# Patient Record
Sex: FEMALE | Race: OTHER | HISPANIC OR LATINO
[De-identification: names, ages, dates, MRNs, and addresses within clinical notes are randomized per-mention and may not be internally consistent; named-entity substitution may affect disease eponyms.]

---

## 2018-03-13 ENCOUNTER — APPOINTMENT (OUTPATIENT)
Dept: ANTEPARTUM | Facility: CLINIC | Age: 43
End: 2018-03-13

## 2018-03-13 PROBLEM — Z00.00 ENCOUNTER FOR PREVENTIVE HEALTH EXAMINATION: Status: ACTIVE | Noted: 2018-03-13

## 2018-03-27 ENCOUNTER — ASOB RESULT (OUTPATIENT)
Age: 43
End: 2018-03-27

## 2018-03-27 ENCOUNTER — APPOINTMENT (OUTPATIENT)
Dept: ANTEPARTUM | Facility: CLINIC | Age: 43
End: 2018-03-27
Payer: MEDICARE

## 2018-03-27 PROCEDURE — 76857 US EXAM PELVIC LIMITED: CPT

## 2018-03-27 PROCEDURE — 76830 TRANSVAGINAL US NON-OB: CPT

## 2019-02-02 ENCOUNTER — EMERGENCY (EMERGENCY)
Facility: HOSPITAL | Age: 44
LOS: 1 days | Discharge: HOME | End: 2019-02-02
Attending: EMERGENCY MEDICINE
Payer: COMMERCIAL

## 2019-02-02 VITALS — WEIGHT: 154.98 LBS | HEIGHT: 65 IN

## 2019-02-02 VITALS
OXYGEN SATURATION: 100 % | SYSTOLIC BLOOD PRESSURE: 136 MMHG | TEMPERATURE: 98 F | RESPIRATION RATE: 18 BRPM | DIASTOLIC BLOOD PRESSURE: 87 MMHG | HEART RATE: 68 BPM

## 2019-02-02 PROCEDURE — 99283 EMERGENCY DEPT VISIT LOW MDM: CPT

## 2019-02-02 RX ORDER — DIAZEPAM 5 MG
1 TABLET ORAL
Qty: 6 | Refills: 0
Start: 2019-02-02 | End: 2019-02-04

## 2019-02-02 RX ORDER — DIAZEPAM 5 MG
5 TABLET ORAL ONCE
Qty: 0 | Refills: 0 | Status: DISCONTINUED | OUTPATIENT
Start: 2019-02-02 | End: 2019-02-02

## 2019-02-02 RX ORDER — IBUPROFEN 200 MG
600 TABLET ORAL ONCE
Qty: 0 | Refills: 0 | Status: COMPLETED | OUTPATIENT
Start: 2019-02-02 | End: 2019-02-02

## 2019-02-02 RX ORDER — IBUPROFEN 200 MG
1 TABLET ORAL
Qty: 15 | Refills: 0
Start: 2019-02-02 | End: 2019-02-06

## 2019-02-02 RX ADMIN — Medication 600 MILLIGRAM(S): at 12:07

## 2019-02-02 RX ADMIN — Medication 5 MILLIGRAM(S): at 12:07

## 2019-02-02 NOTE — ED ADULT TRIAGE NOTE - CHIEF COMPLAINT QUOTE
Pt with headache on left side of head since yesterday. Pt did not take anything for pain. Denies any nausea or photophobia

## 2019-02-02 NOTE — ED STATDOCS - PROGRESS NOTE DETAILS
PA Note: PT evaluated by intake physician. HPI/PE/ROS as noted above. Will follow up plan per intake physician. Pt reports headache has decreased. Pt to follow up with PCP for headaches.

## 2019-02-02 NOTE — ED STATDOCS - OBJECTIVE STATEMENT
44 y/o F pt presents to ED with family c/o left sided headache since yesterday. Denies fevers, chills, photophobia, nausea, vomiting. No further complaints at this time.

## 2019-02-02 NOTE — ED ADULT NURSE NOTE - OBJECTIVE STATEMENT
patient reports headache since 7pm last night, while at work going to break, left sided headache, no trauma, complaining of blurred vision with headache, intermittently, denied numbness or tingling,

## 2019-02-02 NOTE — ED STATDOCS - ATTENDING CONTRIBUTION TO CARE
I, Taylor Almonte, performed the initial face to face bedside interview with this patient regarding history of present illness, review of symptoms and relevant past medical, social and family history.  I completed an independent physical examination.  I was the initial provider who evaluated this patient. I have signed out the follow up of any pending tests (i.e. labs, radiological studies) to the ACP.  I have communicated the patient’s plan of care and disposition with the ACP.  The history, relevant review of systems, past medical and surgical history, medical decision making, and physical examination was documented by the scribe in my presence and I attest to the accuracy of the documentation.

## 2019-09-20 ENCOUNTER — APPOINTMENT (OUTPATIENT)
Dept: NEUROLOGY | Facility: CLINIC | Age: 44
End: 2019-09-20
Payer: OTHER MISCELLANEOUS

## 2019-09-20 VITALS
BODY MASS INDEX: 25.16 KG/M2 | WEIGHT: 151 LBS | SYSTOLIC BLOOD PRESSURE: 120 MMHG | HEIGHT: 65 IN | DIASTOLIC BLOOD PRESSURE: 80 MMHG

## 2019-09-20 PROCEDURE — 99204 OFFICE O/P NEW MOD 45 MIN: CPT

## 2019-10-15 ENCOUNTER — EMERGENCY (EMERGENCY)
Facility: HOSPITAL | Age: 44
LOS: 1 days | Discharge: HOME | End: 2019-10-15
Attending: EMERGENCY MEDICINE
Payer: COMMERCIAL

## 2019-10-15 VITALS
HEART RATE: 89 BPM | TEMPERATURE: 98 F | RESPIRATION RATE: 18 BRPM | OXYGEN SATURATION: 100 % | WEIGHT: 154.98 LBS | SYSTOLIC BLOOD PRESSURE: 142 MMHG | DIASTOLIC BLOOD PRESSURE: 90 MMHG

## 2019-10-15 PROCEDURE — 12002 RPR S/N/AX/GEN/TRNK2.6-7.5CM: CPT

## 2019-10-15 PROCEDURE — 99283 EMERGENCY DEPT VISIT LOW MDM: CPT | Mod: 25

## 2019-10-15 PROCEDURE — 99053 MED SERV 10PM-8AM 24 HR FAC: CPT

## 2019-10-15 RX ORDER — TETANUS TOXOID, REDUCED DIPHTHERIA TOXOID AND ACELLULAR PERTUSSIS VACCINE, ADSORBED 5; 2.5; 8; 8; 2.5 [IU]/.5ML; [IU]/.5ML; UG/.5ML; UG/.5ML; UG/.5ML
0.5 SUSPENSION INTRAMUSCULAR ONCE
Refills: 0 | Status: COMPLETED | OUTPATIENT
Start: 2019-10-15 | End: 2019-10-15

## 2019-10-16 PROCEDURE — 99283 EMERGENCY DEPT VISIT LOW MDM: CPT | Mod: 25

## 2019-10-16 PROCEDURE — T1013: CPT

## 2019-10-16 PROCEDURE — 90715 TDAP VACCINE 7 YRS/> IM: CPT

## 2019-10-16 PROCEDURE — 73140 X-RAY EXAM OF FINGER(S): CPT

## 2019-10-16 PROCEDURE — 90471 IMMUNIZATION ADMIN: CPT

## 2019-10-16 PROCEDURE — 12002 RPR S/N/AX/GEN/TRNK2.6-7.5CM: CPT | Mod: F2

## 2019-10-16 PROCEDURE — 73140 X-RAY EXAM OF FINGER(S): CPT | Mod: 26,LT

## 2019-10-16 RX ORDER — IBUPROFEN 200 MG
1 TABLET ORAL
Qty: 28 | Refills: 0
Start: 2019-10-16 | End: 2019-10-22

## 2019-10-16 RX ORDER — CEPHALEXIN 500 MG
500 CAPSULE ORAL ONCE
Refills: 0 | Status: COMPLETED | OUTPATIENT
Start: 2019-10-16 | End: 2019-10-16

## 2019-10-16 RX ORDER — CEPHALEXIN 500 MG
1 CAPSULE ORAL
Qty: 21 | Refills: 0
Start: 2019-10-16 | End: 2019-10-22

## 2019-10-16 RX ADMIN — Medication 500 MILLIGRAM(S): at 00:54

## 2019-10-16 RX ADMIN — TETANUS TOXOID, REDUCED DIPHTHERIA TOXOID AND ACELLULAR PERTUSSIS VACCINE, ADSORBED 0.5 MILLILITER(S): 5; 2.5; 8; 8; 2.5 SUSPENSION INTRAMUSCULAR at 00:23

## 2019-10-16 NOTE — ED PROVIDER NOTE - ATTENDING CONTRIBUTION TO CARE
I personally saw the patient with the PA, and completed the key components of the history and physical exam. I then discussed the management plan with the PA.   gen in nad resp clear cardiac no murmur abd soft msk skin neurovasc intact lac as documented by pa   agree with pa plan of care

## 2019-10-16 NOTE — ED PROCEDURE NOTE - ATTENDING CONTRIBUTION TO CARE
I personally saw the patient with the PA, and completed the key components of the history and physical exam. I then discussed the management plan with the PA.   agree with procedure as doumented

## 2019-10-16 NOTE — ED PROVIDER NOTE - OBJECTIVE STATEMENT
Patient is a 45 y/o female presenting to the ed for laceration to the left 3rd finger. Patient states finger crush injury at work from a conveyer belt. Patient denies knowing when last tetanus shot was. Patient denies cp, SOB, head injury, neck pain, numbness or loss of sensation, abd pain, back pain

## 2019-10-16 NOTE — ED PROVIDER NOTE - PHYSICAL EXAMINATION
Const: Awake, alert and oriented. In no acute distress. Well appearing.  HEENT: NC/AT. Moist mucous membranes.  Neck:. Soft and supple. Full ROM without pain.  Cardiac:+S1/S2. No murmurs. Peripheral pulses 2+ and symmetric. No LE edema.  Resp: Speaking in full sentences. No evidence of respiratory distress. No wheezes, rales or rhonchi.  Abd: Soft, non-tender, non-distended. Normal bowel sounds in all 4 quadrants. No guarding or rebound.  Back: Spine midline and non-tender. No CVAT.  MSK: FROM in all extremities, neurovasculary intact, radial pulse 2+. hand  5/5   Skin: Laceration noted to left 3rd finger 3.0 cm, no tendon involvement   Lymph: No cervical lymphadenopathy.  Neuro: Awake, alert & oriented x 3. CN II-XII intact, Moves all extremities symmetrically.

## 2019-10-16 NOTE — ED PROVIDER NOTE - PROGRESS NOTE DETAILS
+ fx on xray, distal tuft, short course antibiotics, splint, hand referral, signs of infection discussed with patient, suture removal 7 to 10 days, wound care explained ,pt explained d/c instructions

## 2019-11-05 ENCOUNTER — APPOINTMENT (OUTPATIENT)
Dept: NEUROLOGY | Facility: CLINIC | Age: 44
End: 2019-11-05
Payer: COMMERCIAL

## 2019-11-05 VITALS
WEIGHT: 154 LBS | SYSTOLIC BLOOD PRESSURE: 130 MMHG | HEIGHT: 65 IN | BODY MASS INDEX: 25.66 KG/M2 | DIASTOLIC BLOOD PRESSURE: 70 MMHG

## 2019-11-05 PROBLEM — Z78.9 OTHER SPECIFIED HEALTH STATUS: Chronic | Status: ACTIVE | Noted: 2019-10-15

## 2019-11-05 PROCEDURE — 99214 OFFICE O/P EST MOD 30 MIN: CPT

## 2019-11-05 RX ORDER — PROPRANOLOL HYDROCHLORIDE 40 MG/1
40 TABLET ORAL
Qty: 60 | Refills: 2 | Status: ACTIVE | COMMUNITY
Start: 2019-09-20 | End: 1900-01-01

## 2020-02-11 ENCOUNTER — APPOINTMENT (OUTPATIENT)
Dept: NEUROLOGY | Facility: CLINIC | Age: 45
End: 2020-02-11
Payer: COMMERCIAL

## 2020-02-11 VITALS
WEIGHT: 154 LBS | DIASTOLIC BLOOD PRESSURE: 80 MMHG | BODY MASS INDEX: 25.66 KG/M2 | HEIGHT: 65 IN | SYSTOLIC BLOOD PRESSURE: 120 MMHG

## 2020-02-11 DIAGNOSIS — G43.019 MIGRAINE W/OUT AURA, INTRACTABLE, W/OUT STATUS MIGRAINOSUS: ICD-10-CM

## 2020-02-11 PROCEDURE — 99214 OFFICE O/P EST MOD 30 MIN: CPT

## 2022-04-28 ENCOUNTER — EMERGENCY (EMERGENCY)
Facility: HOSPITAL | Age: 47
LOS: 1 days | Discharge: HOME | End: 2022-04-28
Attending: EMERGENCY MEDICINE
Payer: COMMERCIAL

## 2022-04-28 VITALS
RESPIRATION RATE: 18 BRPM | HEART RATE: 87 BPM | WEIGHT: 171.96 LBS | TEMPERATURE: 100 F | HEIGHT: 65 IN | DIASTOLIC BLOOD PRESSURE: 86 MMHG | SYSTOLIC BLOOD PRESSURE: 144 MMHG | OXYGEN SATURATION: 99 %

## 2022-04-28 LAB
ALBUMIN SERPL ELPH-MCNC: 4.4 G/DL — SIGNIFICANT CHANGE UP (ref 3.3–5.2)
ALP SERPL-CCNC: 74 U/L — SIGNIFICANT CHANGE UP (ref 40–120)
ALT FLD-CCNC: 34 U/L — HIGH
ANION GAP SERPL CALC-SCNC: 13 MMOL/L — SIGNIFICANT CHANGE UP (ref 5–17)
APPEARANCE UR: CLEAR — SIGNIFICANT CHANGE UP
AST SERPL-CCNC: 24 U/L — SIGNIFICANT CHANGE UP
BACTERIA # UR AUTO: ABNORMAL
BASOPHILS # BLD AUTO: 0.02 K/UL — SIGNIFICANT CHANGE UP (ref 0–0.2)
BASOPHILS NFR BLD AUTO: 0.2 % — SIGNIFICANT CHANGE UP (ref 0–2)
BILIRUB SERPL-MCNC: 0.2 MG/DL — LOW (ref 0.4–2)
BILIRUB UR-MCNC: NEGATIVE — SIGNIFICANT CHANGE UP
BUN SERPL-MCNC: 10.7 MG/DL — SIGNIFICANT CHANGE UP (ref 8–20)
CALCIUM SERPL-MCNC: 9.4 MG/DL — SIGNIFICANT CHANGE UP (ref 8.6–10.2)
CHLORIDE SERPL-SCNC: 106 MMOL/L — SIGNIFICANT CHANGE UP (ref 98–107)
CO2 SERPL-SCNC: 21 MMOL/L — LOW (ref 22–29)
COLOR SPEC: YELLOW — SIGNIFICANT CHANGE UP
CREAT SERPL-MCNC: 0.86 MG/DL — SIGNIFICANT CHANGE UP (ref 0.5–1.3)
DIFF PNL FLD: ABNORMAL
EGFR: 84 ML/MIN/1.73M2 — SIGNIFICANT CHANGE UP
EOSINOPHIL # BLD AUTO: 0.04 K/UL — SIGNIFICANT CHANGE UP (ref 0–0.5)
EOSINOPHIL NFR BLD AUTO: 0.5 % — SIGNIFICANT CHANGE UP (ref 0–6)
EPI CELLS # UR: SIGNIFICANT CHANGE UP
GLUCOSE SERPL-MCNC: 152 MG/DL — HIGH (ref 70–99)
GLUCOSE UR QL: 100 MG/DL
HCG SERPL-ACNC: <4 MIU/ML — SIGNIFICANT CHANGE UP
HCT VFR BLD CALC: 40 % — SIGNIFICANT CHANGE UP (ref 34.5–45)
HGB BLD-MCNC: 13.2 G/DL — SIGNIFICANT CHANGE UP (ref 11.5–15.5)
IMM GRANULOCYTES NFR BLD AUTO: 0.1 % — SIGNIFICANT CHANGE UP (ref 0–1.5)
KETONES UR-MCNC: NEGATIVE — SIGNIFICANT CHANGE UP
LEUKOCYTE ESTERASE UR-ACNC: ABNORMAL
LYMPHOCYTES # BLD AUTO: 1.08 K/UL — SIGNIFICANT CHANGE UP (ref 1–3.3)
LYMPHOCYTES # BLD AUTO: 13 % — SIGNIFICANT CHANGE UP (ref 13–44)
MCHC RBC-ENTMCNC: 28.9 PG — SIGNIFICANT CHANGE UP (ref 27–34)
MCHC RBC-ENTMCNC: 33 GM/DL — SIGNIFICANT CHANGE UP (ref 32–36)
MCV RBC AUTO: 87.7 FL — SIGNIFICANT CHANGE UP (ref 80–100)
MONOCYTES # BLD AUTO: 0.59 K/UL — SIGNIFICANT CHANGE UP (ref 0–0.9)
MONOCYTES NFR BLD AUTO: 7.1 % — SIGNIFICANT CHANGE UP (ref 2–14)
NEUTROPHILS # BLD AUTO: 6.56 K/UL — SIGNIFICANT CHANGE UP (ref 1.8–7.4)
NEUTROPHILS NFR BLD AUTO: 79.1 % — HIGH (ref 43–77)
NITRITE UR-MCNC: NEGATIVE — SIGNIFICANT CHANGE UP
PH UR: 7 — SIGNIFICANT CHANGE UP (ref 5–8)
PLATELET # BLD AUTO: 281 K/UL — SIGNIFICANT CHANGE UP (ref 150–400)
POTASSIUM SERPL-MCNC: 4.2 MMOL/L — SIGNIFICANT CHANGE UP (ref 3.5–5.3)
POTASSIUM SERPL-SCNC: 4.2 MMOL/L — SIGNIFICANT CHANGE UP (ref 3.5–5.3)
PROT SERPL-MCNC: 7.6 G/DL — SIGNIFICANT CHANGE UP (ref 6.6–8.7)
PROT UR-MCNC: NEGATIVE — SIGNIFICANT CHANGE UP
RBC # BLD: 4.56 M/UL — SIGNIFICANT CHANGE UP (ref 3.8–5.2)
RBC # FLD: 12.7 % — SIGNIFICANT CHANGE UP (ref 10.3–14.5)
RBC CASTS # UR COMP ASSIST: SIGNIFICANT CHANGE UP /HPF (ref 0–4)
SODIUM SERPL-SCNC: 140 MMOL/L — SIGNIFICANT CHANGE UP (ref 135–145)
SP GR SPEC: 1.01 — SIGNIFICANT CHANGE UP (ref 1.01–1.02)
UROBILINOGEN FLD QL: NEGATIVE MG/DL — SIGNIFICANT CHANGE UP
WBC # BLD: 8.3 K/UL — SIGNIFICANT CHANGE UP (ref 3.8–10.5)
WBC # FLD AUTO: 8.3 K/UL — SIGNIFICANT CHANGE UP (ref 3.8–10.5)
WBC UR QL: SIGNIFICANT CHANGE UP /HPF (ref 0–5)

## 2022-04-28 PROCEDURE — 99284 EMERGENCY DEPT VISIT MOD MDM: CPT | Mod: 25

## 2022-04-28 PROCEDURE — 36415 COLL VENOUS BLD VENIPUNCTURE: CPT

## 2022-04-28 PROCEDURE — 81001 URINALYSIS AUTO W/SCOPE: CPT

## 2022-04-28 PROCEDURE — 96374 THER/PROPH/DIAG INJ IV PUSH: CPT

## 2022-04-28 PROCEDURE — 80053 COMPREHEN METABOLIC PANEL: CPT

## 2022-04-28 PROCEDURE — 74176 CT ABD & PELVIS W/O CONTRAST: CPT | Mod: MA

## 2022-04-28 PROCEDURE — 84702 CHORIONIC GONADOTROPIN TEST: CPT

## 2022-04-28 PROCEDURE — 99284 EMERGENCY DEPT VISIT MOD MDM: CPT

## 2022-04-28 PROCEDURE — 85025 COMPLETE CBC W/AUTO DIFF WBC: CPT

## 2022-04-28 RX ORDER — MORPHINE SULFATE 50 MG/1
4 CAPSULE, EXTENDED RELEASE ORAL ONCE
Refills: 0 | Status: DISCONTINUED | OUTPATIENT
Start: 2022-04-28 | End: 2022-04-28

## 2022-04-28 RX ADMIN — MORPHINE SULFATE 4 MILLIGRAM(S): 50 CAPSULE, EXTENDED RELEASE ORAL at 22:59

## 2022-04-28 RX ADMIN — MORPHINE SULFATE 4 MILLIGRAM(S): 50 CAPSULE, EXTENDED RELEASE ORAL at 22:20

## 2022-04-28 NOTE — ED STATDOCS - NS_ ATTENDINGSCRIBEDETAILS _ED_A_ED_FT
I, Humberto Pfeiffer, performed the initial face to face bedside interview with this patient regarding history of present illness, review of symptoms and relevant past medical, social and family history.  I completed an independent physical examination.  I was the initial provider who evaluated this patient. I have signed out the follow up of any pending tests (i.e. labs, radiological studies) to the ACP.  I have communicated the patient’s plan of care and disposition with the ACP.  The history, relevant review of systems, past medical and surgical history, medical decision making, and physical examination was documented by the scribe in my presence and I attest to the accuracy of the documentation.

## 2022-04-28 NOTE — ED ADULT NURSE REASSESSMENT NOTE - NS ED NURSE REASSESS COMMENT FT1
AxOx4. Patient c/o right sided lower abd pain. Abd soft, non distended, but tender on palpation. IV placed, labs sent, medicated as per orders. Reassured patient of treatment plan.

## 2022-04-28 NOTE — ED STATDOCS - OBJECTIVE STATEMENT
48 y/o female with no PMHx, c/o abdominal pain. Patient report having right lower abdominal pain with nausea and vomiting 1x since 7:30pm. Denies urinary symptoms. Pt denies taking pain medication. Patient has similar symptoms before with the pain radiating to flank about 5 months. Pt didn't f/u with PMD.

## 2022-04-28 NOTE — ED STATDOCS - PATIENT PORTAL LINK FT
You can access the FollowMyHealth Patient Portal offered by [facility] by registering at the following website: [email or web address]. By joining [facility]’s FollowMyHealth portal, you will also be able to view your health information using other applications (apps) compatible with our system.

## 2022-04-29 PROCEDURE — 74176 CT ABD & PELVIS W/O CONTRAST: CPT | Mod: 26,MA

## 2024-07-27 ENCOUNTER — EMERGENCY (EMERGENCY)
Facility: HOSPITAL | Age: 49
LOS: 1 days | Discharge: HOME | End: 2024-07-27
Attending: EMERGENCY MEDICINE
Payer: COMMERCIAL

## 2024-07-27 VITALS
DIASTOLIC BLOOD PRESSURE: 72 MMHG | OXYGEN SATURATION: 98 % | TEMPERATURE: 98 F | HEART RATE: 71 BPM | RESPIRATION RATE: 18 BRPM | SYSTOLIC BLOOD PRESSURE: 99 MMHG

## 2024-07-27 VITALS
HEIGHT: 65 IN | TEMPERATURE: 100 F | RESPIRATION RATE: 20 BRPM | SYSTOLIC BLOOD PRESSURE: 100 MMHG | HEART RATE: 89 BPM | WEIGHT: 167.33 LBS | OXYGEN SATURATION: 96 % | DIASTOLIC BLOOD PRESSURE: 69 MMHG

## 2024-07-27 LAB
ALBUMIN SERPL ELPH-MCNC: 3.7 G/DL — SIGNIFICANT CHANGE UP (ref 3.3–5.2)
ALP SERPL-CCNC: 62 U/L — SIGNIFICANT CHANGE UP (ref 40–120)
ALT FLD-CCNC: 88 U/L — HIGH
ANION GAP SERPL CALC-SCNC: 17 MMOL/L — SIGNIFICANT CHANGE UP (ref 5–17)
APPEARANCE UR: ABNORMAL
AST SERPL-CCNC: 87 U/L — HIGH
BACTERIA # UR AUTO: ABNORMAL /HPF
BASOPHILS # BLD AUTO: 0.01 K/UL — SIGNIFICANT CHANGE UP (ref 0–0.2)
BASOPHILS NFR BLD AUTO: 0.4 % — SIGNIFICANT CHANGE UP (ref 0–2)
BILIRUB SERPL-MCNC: 0.3 MG/DL — LOW (ref 0.4–2)
BILIRUB UR-MCNC: NEGATIVE — SIGNIFICANT CHANGE UP
BUN SERPL-MCNC: 12.1 MG/DL — SIGNIFICANT CHANGE UP (ref 8–20)
CALCIUM SERPL-MCNC: 8.8 MG/DL — SIGNIFICANT CHANGE UP (ref 8.4–10.5)
CAST: 1 /LPF — SIGNIFICANT CHANGE UP (ref 0–4)
CHLORIDE SERPL-SCNC: 97 MMOL/L — SIGNIFICANT CHANGE UP (ref 96–108)
CO2 SERPL-SCNC: 19 MMOL/L — LOW (ref 22–29)
COLOR SPEC: SIGNIFICANT CHANGE UP
CREAT SERPL-MCNC: 0.96 MG/DL — SIGNIFICANT CHANGE UP (ref 0.5–1.3)
DIFF PNL FLD: NEGATIVE — SIGNIFICANT CHANGE UP
EGFR: 73 ML/MIN/1.73M2 — SIGNIFICANT CHANGE UP
EOSINOPHIL # BLD AUTO: 0 K/UL — SIGNIFICANT CHANGE UP (ref 0–0.5)
EOSINOPHIL NFR BLD AUTO: 0 % — SIGNIFICANT CHANGE UP (ref 0–6)
GLUCOSE SERPL-MCNC: 130 MG/DL — HIGH (ref 70–99)
GLUCOSE UR QL: NEGATIVE MG/DL — SIGNIFICANT CHANGE UP
HCG SERPL-ACNC: <4 MIU/ML — SIGNIFICANT CHANGE UP
HCT VFR BLD CALC: 41.7 % — SIGNIFICANT CHANGE UP (ref 34.5–45)
HGB BLD-MCNC: 14.1 G/DL — SIGNIFICANT CHANGE UP (ref 11.5–15.5)
IMM GRANULOCYTES NFR BLD AUTO: 0.4 % — SIGNIFICANT CHANGE UP (ref 0–0.9)
KETONES UR-MCNC: 15 MG/DL
LEUKOCYTE ESTERASE UR-ACNC: ABNORMAL
LYMPHOCYTES # BLD AUTO: 0.48 K/UL — LOW (ref 1–3.3)
LYMPHOCYTES # BLD AUTO: 19.8 % — SIGNIFICANT CHANGE UP (ref 13–44)
MCHC RBC-ENTMCNC: 29 PG — SIGNIFICANT CHANGE UP (ref 27–34)
MCHC RBC-ENTMCNC: 33.8 GM/DL — SIGNIFICANT CHANGE UP (ref 32–36)
MCV RBC AUTO: 85.8 FL — SIGNIFICANT CHANGE UP (ref 80–100)
MONOCYTES # BLD AUTO: 0.3 K/UL — SIGNIFICANT CHANGE UP (ref 0–0.9)
MONOCYTES NFR BLD AUTO: 12.3 % — SIGNIFICANT CHANGE UP (ref 2–14)
NEUTROPHILS # BLD AUTO: 1.63 K/UL — LOW (ref 1.8–7.4)
NEUTROPHILS NFR BLD AUTO: 67.1 % — SIGNIFICANT CHANGE UP (ref 43–77)
NITRITE UR-MCNC: NEGATIVE — SIGNIFICANT CHANGE UP
PH UR: 6 — SIGNIFICANT CHANGE UP (ref 5–8)
PLATELET # BLD AUTO: 205 K/UL — SIGNIFICANT CHANGE UP (ref 150–400)
POTASSIUM SERPL-MCNC: 3.9 MMOL/L — SIGNIFICANT CHANGE UP (ref 3.5–5.3)
POTASSIUM SERPL-SCNC: 3.9 MMOL/L — SIGNIFICANT CHANGE UP (ref 3.5–5.3)
PROT SERPL-MCNC: 7 G/DL — SIGNIFICANT CHANGE UP (ref 6.6–8.7)
PROT UR-MCNC: 30 MG/DL
RBC # BLD: 4.86 M/UL — SIGNIFICANT CHANGE UP (ref 3.8–5.2)
RBC # FLD: 13.2 % — SIGNIFICANT CHANGE UP (ref 10.3–14.5)
RBC CASTS # UR COMP ASSIST: 5 /HPF — HIGH (ref 0–4)
SODIUM SERPL-SCNC: 132 MMOL/L — LOW (ref 135–145)
SP GR SPEC: 1.02 — SIGNIFICANT CHANGE UP (ref 1–1.03)
SQUAMOUS # UR AUTO: 8 /HPF — HIGH (ref 0–5)
UROBILINOGEN FLD QL: 1 MG/DL — SIGNIFICANT CHANGE UP (ref 0.2–1)
WBC # BLD: 2.43 K/UL — LOW (ref 3.8–10.5)
WBC # FLD AUTO: 2.43 K/UL — LOW (ref 3.8–10.5)
WBC UR QL: 4 /HPF — SIGNIFICANT CHANGE UP (ref 0–5)

## 2024-07-27 PROCEDURE — 99284 EMERGENCY DEPT VISIT MOD MDM: CPT

## 2024-07-27 PROCEDURE — 87086 URINE CULTURE/COLONY COUNT: CPT

## 2024-07-27 PROCEDURE — 99284 EMERGENCY DEPT VISIT MOD MDM: CPT | Mod: 25

## 2024-07-27 PROCEDURE — 80053 COMPREHEN METABOLIC PANEL: CPT

## 2024-07-27 PROCEDURE — 96374 THER/PROPH/DIAG INJ IV PUSH: CPT

## 2024-07-27 PROCEDURE — 81001 URINALYSIS AUTO W/SCOPE: CPT

## 2024-07-27 PROCEDURE — 87077 CULTURE AEROBIC IDENTIFY: CPT

## 2024-07-27 PROCEDURE — 85025 COMPLETE CBC W/AUTO DIFF WBC: CPT

## 2024-07-27 PROCEDURE — 36415 COLL VENOUS BLD VENIPUNCTURE: CPT

## 2024-07-27 PROCEDURE — 84702 CHORIONIC GONADOTROPIN TEST: CPT

## 2024-07-27 RX ORDER — SODIUM CHLORIDE 0.9 % (FLUSH) 0.9 %
1000 SYRINGE (ML) INJECTION ONCE
Refills: 0 | Status: COMPLETED | OUTPATIENT
Start: 2024-07-27 | End: 2024-07-27

## 2024-07-27 RX ORDER — KETOROLAC TROMETHAMINE 30 MG/ML
30 INJECTION, SOLUTION INTRAMUSCULAR ONCE
Refills: 0 | Status: DISCONTINUED | OUTPATIENT
Start: 2024-07-27 | End: 2024-07-27

## 2024-07-27 RX ADMIN — KETOROLAC TROMETHAMINE 30 MILLIGRAM(S): 30 INJECTION, SOLUTION INTRAMUSCULAR at 07:44

## 2024-07-27 RX ADMIN — Medication 1000 MILLILITER(S): at 07:44

## 2024-07-27 RX ADMIN — KETOROLAC TROMETHAMINE 30 MILLIGRAM(S): 30 INJECTION, SOLUTION INTRAMUSCULAR at 07:43
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
## 2024-07-27 NOTE — ED PROVIDER NOTE - ATTENDING APP SHARED VISIT CONTRIBUTION OF CARE
49-year-old female presented with low back pain radiating to suprapubic area for 3 days.  Symptoms-year-old female with nausea and vomiting.  Labs, UA. I agree with management of the patient by the PA.

## 2024-07-27 NOTE — ED PROVIDER NOTE - CLINICAL SUMMARY MEDICAL DECISION MAKING FREE TEXT BOX
48 y/o F with low back pain - most likely musculoskeletal - labs and UA to r/o pyelonephritis - will consider CT based on lab results and re-eval

## 2024-07-27 NOTE — ED PROVIDER NOTE - PATIENT PORTAL LINK FT
You can access the FollowMyHealth Patient Portal offered by [facility] by registering at the following website: [email or web address]. By joining [employer]’s FollowMyHealth portal, you will also be able to view your health information using other applications (apps) compatible with our system.

## 2024-07-27 NOTE — ED ADULT TRIAGE NOTE - CHIEF COMPLAINT QUOTE
Patient presents to ED with c/o b/l flank pain radiating to b/l abdomen that started last night at 2200 associated with N/V.  Last Motrin at 2200 last night and Tylenol about one hour after.  Denies urinary symptoms.

## 2024-07-27 NOTE — ED PROVIDER NOTE - OBJECTIVE STATEMENT
48 y/o F with no PMH c/o bilateral low back pain radiating to suprapubic area x 3 days, worsened last night.  Denies dysuria, hematuria, trauma.  Pt c/o subjective fever with nausea/vomiting.  Pt took tylenol and motrin yesterday for symptoms.  LMP last week.  Denies chance of pregnancy.

## 2024-07-27 NOTE — ED ADULT NURSE NOTE - OBJECTIVE STATEMENT
pt presents to the ED c.lo of lower back pain and R sided flank pain that started a few days ago. pt admits to N/V and fevers, admits to taking Motrin with minimal relief. pt last dose of Motrin @9 pm last night. pt denies burning or painful urination. pt has no other complaints, a&ox4.

## 2024-07-27 NOTE — ED ADULT TRIAGE NOTE - BP NONINVASIVE DIASTOLIC (MM HG)
Telephone Encounter by Heydi Rodrigez RN at 08/18/17 10:20 AM     Author:  Heydi Rodrigez RN Service:  (none) Author Type:  Registered Nurse     Filed:  08/18/17 10:20 AM Encounter Date:  8/17/2017 Status:  Signed     :  Heydi Rodrigez RN (Registered Nurse)       From: Uri Dietrich  To: Yuniel Saini MD  Sent: 8/17/2017  9:11 PM CDT  Subject: Medication Questions    Wondering why the carvediol 6.25 mg has been denied       Revision History        Date/Time User Provider Type Action    > 08/18/17 10:20 AM Heydi Rodrigez RN Registered Nurse Sign    Attribution information within the note text is not available.             69

## 2024-07-31 LAB
CULTURE RESULTS: SIGNIFICANT CHANGE UP
SPECIMEN SOURCE: SIGNIFICANT CHANGE UP

## 2024-08-27 NOTE — ED ADULT TRIAGE NOTE - GLASGOW COMA SCALE: SCORE, MLM
Diet, NPO with Tube Feed:   Tube Feeding Modality: Nasogastric  Jevity 1.2 Damir (JEVITY1.2RTH)  Total Volume for 24 Hours (mL): 1000  Bolus  Total Volume of Bolus (mL):  250  Tube Feed Frequency: Every 6 hours   Tube Feed Start Time: 14:30  Bolus Feed Rate (mL per Hour): 175   Bolus Feed Duration (in Hours): 2  Bolus   Total Volume per Flush (mL): 100   Frequency: Every 6 Hours (08-25-24 @ 14:36) [Active]       15

## 2025-05-13 ENCOUNTER — APPOINTMENT (OUTPATIENT)
Dept: GASTROENTEROLOGY | Facility: CLINIC | Age: 50
End: 2025-05-13
Payer: COMMERCIAL

## 2025-05-13 VITALS
DIASTOLIC BLOOD PRESSURE: 86 MMHG | HEIGHT: 65 IN | HEART RATE: 70 BPM | WEIGHT: 159 LBS | BODY MASS INDEX: 26.49 KG/M2 | SYSTOLIC BLOOD PRESSURE: 138 MMHG | RESPIRATION RATE: 14 BRPM | OXYGEN SATURATION: 98 % | TEMPERATURE: 97.2 F

## 2025-05-13 DIAGNOSIS — Z12.11 ENCOUNTER FOR SCREENING FOR MALIGNANT NEOPLASM OF COLON: ICD-10-CM

## 2025-05-13 PROCEDURE — 99202 OFFICE O/P NEW SF 15 MIN: CPT

## 2025-05-13 RX ORDER — POLYETHYLENE GLYCOL 3350, SODIUM SULFATE, POTASSIUM CHLORIDE, MAGNESIUM SULFATE, AND SODIUM CHLORIDE FOR ORAL SOLUTION 178.7-7.3G
178.7 KIT ORAL
Qty: 1 | Refills: 0 | Status: ACTIVE | COMMUNITY
Start: 2025-05-13 | End: 1900-01-01

## 2025-07-18 ENCOUNTER — OUTPATIENT (OUTPATIENT)
Dept: OUTPATIENT SERVICES | Facility: HOSPITAL | Age: 50
LOS: 1 days | End: 2025-07-18
Payer: COMMERCIAL

## 2025-07-18 ENCOUNTER — APPOINTMENT (OUTPATIENT)
Dept: GASTROENTEROLOGY | Facility: GI CENTER | Age: 50
End: 2025-07-18
Payer: COMMERCIAL

## 2025-07-18 ENCOUNTER — TRANSCRIPTION ENCOUNTER (OUTPATIENT)
Age: 50
End: 2025-07-18

## 2025-07-18 DIAGNOSIS — Z12.11 ENCOUNTER FOR SCREENING FOR MALIGNANT NEOPLASM OF COLON: ICD-10-CM

## 2025-07-18 PROBLEM — K64.8 INTERNAL HEMORRHOIDS: Status: ACTIVE | Noted: 2025-07-18

## 2025-07-18 PROCEDURE — 45378 DIAGNOSTIC COLONOSCOPY: CPT | Mod: 33

## 2025-07-18 PROCEDURE — G0121: CPT

## (undated) DEVICE — KIT DEFENDO 4 OLY 4 PC